# Patient Record
Sex: MALE | Race: WHITE | NOT HISPANIC OR LATINO | Employment: FULL TIME | URBAN - METROPOLITAN AREA
[De-identification: names, ages, dates, MRNs, and addresses within clinical notes are randomized per-mention and may not be internally consistent; named-entity substitution may affect disease eponyms.]

---

## 2017-06-07 ENCOUNTER — TELEPHONE (OUTPATIENT)
Dept: FAMILY MEDICINE CLINIC | Facility: CLINIC | Age: 42
End: 2017-06-07

## 2017-06-07 NOTE — TELEPHONE ENCOUNTER
----- Message from Zoie Rogers sent at 6/7/2017 12:02 PM EDT -----  Contact: ARELIS/WILY  PATIENT CALLING ABOUT HIS PAPERWORK.   IF AT ALL POSSIBLE, HE NEEDS THAT FAXED TODAY PLEASE.

## 2019-07-12 RX ORDER — FINASTERIDE 5 MG/1
5 TABLET, FILM COATED ORAL DAILY
COMMUNITY

## 2019-07-12 RX ORDER — FLUTICASONE PROPIONATE 50 MCG
2 SPRAY, SUSPENSION (ML) NASAL DAILY
COMMUNITY

## 2019-07-12 RX ORDER — ESCITALOPRAM OXALATE 20 MG/1
20 TABLET ORAL DAILY
COMMUNITY

## 2019-07-15 ENCOUNTER — OFFICE VISIT (OUTPATIENT)
Dept: NEUROSURGERY | Facility: CLINIC | Age: 44
End: 2019-07-15

## 2019-07-15 VITALS
HEIGHT: 68 IN | TEMPERATURE: 97.6 F | SYSTOLIC BLOOD PRESSURE: 120 MMHG | WEIGHT: 197 LBS | DIASTOLIC BLOOD PRESSURE: 80 MMHG | BODY MASS INDEX: 29.86 KG/M2

## 2019-07-15 DIAGNOSIS — M51.36 DEGENERATIVE DISC DISEASE, LUMBAR: ICD-10-CM

## 2019-07-15 DIAGNOSIS — M50.10 HERNIATION OF CERVICAL INTERVERTEBRAL DISC WITH RADICULOPATHY: Primary | ICD-10-CM

## 2019-07-15 DIAGNOSIS — M47.812 CERVICAL SPONDYLOSIS WITHOUT MYELOPATHY: ICD-10-CM

## 2019-07-15 PROCEDURE — 99243 OFF/OP CNSLTJ NEW/EST LOW 30: CPT | Performed by: NEUROLOGICAL SURGERY

## 2019-07-15 RX ORDER — LAMOTRIGINE 100 MG/1
100 TABLET ORAL DAILY
Refills: 1 | COMMUNITY
Start: 2019-05-24

## 2019-07-15 NOTE — PROGRESS NOTES
NAME: SALLY BERNAL   DOS: 7/15/2019  : 1975  PCP: Harjinder Hughes MD    Chief Complaint:    Chief Complaint   Patient presents with   • Neck Pain       History of Present Illness:  44 y.o. male   I saw this very pleasant 44-year-old who presents for surgical consultation.  He works at [employer] and has a family history of degenerative cervical spinal disease.  He reports a history of right-sided paraspinal neck and arm pain it began with a crick in the neck its been since February he said some tingling and numbness in his thumb and index finger consistent with C6 or perhaps a lower C7 radiculitis he also had some upper shoulder issues he reports resolution of most of his neck pain and he is about 50% better overall he denies any bowel bladder incontinence issues he denies any other neurologic progressive symptomatologies and is here for evaluation he has not been through physical therapy continues to work    PMHX  Allergies:  No Known Allergies  Medications    Current Outpatient Medications:   •  Brexpiprazole (REXULTI) 2 MG tablet, Take  by mouth., Disp: , Rfl:   •  escitalopram (LEXAPRO) 20 MG tablet, Take 20 mg by mouth Daily., Disp: , Rfl:   •  finasteride (PROSCAR) 5 MG tablet, Take 5 mg by mouth Daily., Disp: , Rfl:   •  fluticasone (FLONASE) 50 MCG/ACT nasal spray, 2 sprays into the nostril(s) as directed by provider Daily., Disp: , Rfl:   •  lamoTRIgine (LaMICtal) 100 MG tablet, Take 100 mg by mouth Daily., Disp: , Rfl: 1  Past Medical History:  History reviewed. No pertinent past medical history.  Past Surgical History:  History reviewed. No pertinent surgical history.  Social Hx:  Social History     Tobacco Use   • Smoking status: Current Some Day Smoker     Types: Cigars   Substance Use Topics   • Alcohol use: Yes   • Drug use: Not on file     Family Hx:  Family History   Problem Relation Age of Onset   • Cancer Father      Review of Systems:        Review of Systems   Constitutional: Negative  for activity change, appetite change, chills, diaphoresis, fatigue, fever and unexpected weight change.   HENT: Negative for congestion, dental problem, drooling, ear discharge, ear pain, facial swelling, hearing loss, mouth sores, nosebleeds, postnasal drip, rhinorrhea, sinus pressure, sinus pain, sneezing, sore throat, tinnitus, trouble swallowing and voice change.    Eyes: Negative for photophobia, pain, discharge, redness, itching and visual disturbance.   Respiratory: Negative for apnea, cough, choking, chest tightness, shortness of breath, wheezing and stridor.    Cardiovascular: Negative for chest pain, palpitations and leg swelling.   Gastrointestinal: Negative for abdominal distention, abdominal pain, anal bleeding, blood in stool, constipation, diarrhea, nausea, rectal pain and vomiting.   Endocrine: Negative for cold intolerance, heat intolerance, polydipsia, polyphagia and polyuria.   Genitourinary: Negative for decreased urine volume, difficulty urinating, discharge, dysuria, enuresis, flank pain, frequency, genital sores, hematuria, penile pain, penile swelling, scrotal swelling, testicular pain and urgency.   Musculoskeletal: Positive for neck pain. Negative for arthralgias, back pain, gait problem, joint swelling, myalgias and neck stiffness.   Skin: Negative for color change, pallor, rash and wound.   Allergic/Immunologic: Negative for environmental allergies, food allergies and immunocompromised state.   Neurological: Negative for dizziness, tremors, seizures, syncope, facial asymmetry, speech difficulty, weakness, light-headedness, numbness and headaches.   Hematological: Negative for adenopathy. Does not bruise/bleed easily.   Psychiatric/Behavioral: Negative for agitation, behavioral problems, confusion, decreased concentration, dysphoric mood, hallucinations, self-injury, sleep disturbance and suicidal ideas. The patient is not nervous/anxious and is not hyperactive.       I have reviewed this  "note template and all pertinent parts of the review of systems social, family history, surgical history and medication list      Physical Examination:  There were no vitals filed for this visit.   General Appearance:   Well developed, well nourished, well groomed, alert, and cooperative.  Neurological examination:  Neurologic Exam  Vital signs were reviewed and documented in the chart  Patient appeared in good neurologic function with normal comprehension fluent speech  Mood and affect are normal  Sense of smell deferred    Pupils symmetric equally reactive funduscopic exam not visualized   Visual fields intact to confrontation  Extraocular movements intact  Face motor function is symmetric  Facial sensations normal  Hearing intact to finger rub hearing intact to finger rub  Tongue is midline  Palate symmetric  Swallowing normal  Shoulder shrug normal    Muscle bulk and tone normal  5 out of 5 strength no motor drift  Gait normal intact  Negative Romberg  No clonus long tract signs or myelopathy    Reflexes symmetric  No edema noted and extremities skin appears normal    Straight leg raise sign absent  No signs of intrinsic hip dysfunction  Back is without any lesions or abnormality  Feet are warm and well perfused        Review of Imaging/DATA:  I reviewed his MRI of his cervical spine as well as the report they read out \"severe stenosis \"it is actually pretty tight on the lateral recess he does have multilevel degenerative disc most prominently at C5-6 and C6-7 and to a lesser extent at C4-5 there is no cord signal change  Diagnoses/Plan:    Mr. Chong is a 44 y.o. male   My suspicion is is that he has polyradiculitis referrable to the C5-6 and C6-7 predominantly however C4-5 is not far behind he has a family history of this type of issue with a father who is had a fusion and continues to work he is better on the weekends he has to occasionally wear a hard hat at work that is irritating.  I recommended some " Voltaren gel cervical traction physical therapy and the tincture of time.  I told him not to stress his neck and avoid any activities that would put him at risk for trauma and I would monitor this.  I think at his age to undergo a 2 or perhaps 3 level cervical fusion would be somewhat premature but not out of the question, it shows that I would consider it a probable last option.    From my standpoint we will see him back in a few months we will monitor his progress and obviously for signs of myelopathy if his symptoms persist we can contemplate surgery.

## 2019-07-17 ENCOUNTER — DOCUMENTATION (OUTPATIENT)
Dept: NEUROSURGERY | Facility: CLINIC | Age: 44
End: 2019-07-17